# Patient Record
Sex: MALE | Race: WHITE
[De-identification: names, ages, dates, MRNs, and addresses within clinical notes are randomized per-mention and may not be internally consistent; named-entity substitution may affect disease eponyms.]

---

## 2018-01-09 ENCOUNTER — HOSPITAL ENCOUNTER (EMERGENCY)
Dept: HOSPITAL 17 - NED | Age: 68
Discharge: LEFT BEFORE DISCHARGE | End: 2018-01-09
Payer: OTHER GOVERNMENT

## 2018-01-09 VITALS
SYSTOLIC BLOOD PRESSURE: 150 MMHG | RESPIRATION RATE: 16 BRPM | DIASTOLIC BLOOD PRESSURE: 100 MMHG | TEMPERATURE: 97.6 F | HEART RATE: 86 BPM | OXYGEN SATURATION: 96 %

## 2018-01-09 DIAGNOSIS — Z53.21: Primary | ICD-10-CM

## 2018-01-09 PROCEDURE — 99281 EMR DPT VST MAYX REQ PHY/QHP: CPT

## 2018-02-27 ENCOUNTER — HOSPITAL ENCOUNTER (EMERGENCY)
Dept: HOSPITAL 17 - PHED | Age: 68
Discharge: HOME | End: 2018-02-27
Payer: OTHER GOVERNMENT

## 2018-02-27 VITALS — HEIGHT: 71 IN | WEIGHT: 232.81 LBS | BODY MASS INDEX: 32.59 KG/M2

## 2018-02-27 VITALS
RESPIRATION RATE: 18 BRPM | DIASTOLIC BLOOD PRESSURE: 86 MMHG | SYSTOLIC BLOOD PRESSURE: 155 MMHG | HEART RATE: 66 BPM | TEMPERATURE: 97.6 F

## 2018-02-27 VITALS
SYSTOLIC BLOOD PRESSURE: 133 MMHG | RESPIRATION RATE: 18 BRPM | DIASTOLIC BLOOD PRESSURE: 90 MMHG | OXYGEN SATURATION: 98 % | HEART RATE: 68 BPM

## 2018-02-27 DIAGNOSIS — F41.9: ICD-10-CM

## 2018-02-27 DIAGNOSIS — E78.00: ICD-10-CM

## 2018-02-27 DIAGNOSIS — R22.0: ICD-10-CM

## 2018-02-27 DIAGNOSIS — Z85.819: ICD-10-CM

## 2018-02-27 DIAGNOSIS — Z88.0: ICD-10-CM

## 2018-02-27 DIAGNOSIS — Z79.82: ICD-10-CM

## 2018-02-27 DIAGNOSIS — Z86.73: ICD-10-CM

## 2018-02-27 DIAGNOSIS — I10: ICD-10-CM

## 2018-02-27 DIAGNOSIS — Z79.899: ICD-10-CM

## 2018-02-27 DIAGNOSIS — T78.40XA: Primary | ICD-10-CM

## 2018-02-27 DIAGNOSIS — J44.9: ICD-10-CM

## 2018-02-27 PROCEDURE — 99283 EMERGENCY DEPT VISIT LOW MDM: CPT

## 2018-02-27 NOTE — PD
HPI


Chief Complaint:  Facial Pain or Swelling


Time Seen by Provider:  03:48


Travel History


International Travel<30 days:  No


Contact w/Intl Traveler<30days:  No


Traveled to known affect area:  No





History of Present Illness


HPI


The patient is a 67-year-old male that complains of facial swelling since 1:30 

morning yesterday.  Yesterday at 3:00 he took 25 Benadryl and then again at 11:

00 yesterday and finally at 7:30 PM yesterday.  This is approximately his third 

episode of facial swelling since Christmas.  He is being worked up by the 

[facility] for the cause of his allergy, looking for allergens.  He has 

not heard back from them yet.  He denies any wheezing or shortness of breath.  

He denies any syncopal or near syncopal spells.  He denies any chest pain or 

fever.  He has not had a cough.





PFSH


Past Medical History


Arthritis:  Yes


Anxiety:  Yes


Heart Rhythm Problems:  No


Cancer:  Yes (ORAL)


Cardiac Catheterization:  Yes (in 2000)


Cardiovascular Problems:  Yes


High Cholesterol:  Yes


Congestive Heart Failure:  No


COPD:  Yes


Cerebrovascular Accident:  Yes ( told him possible TIAs)


Diabetes:  No


Diminished Hearing:  No


Diverticulitis:  Yes


Hepatitis:  Yes (Hep A)


Hypertension:  Yes


Respiratory:  Yes (SLEEP APNEA, Emphysema, Black lung)


Immunizations Current:  No


Sleep Apnea:  Yes


Influenza Vaccination:  Yes


Pregnant?:  Not Pregnant





Past Surgical History


Abdominal Surgery:  Yes (BILAT HERNIA REPAIR)


Appendectomy:  Yes


Coronary Artery Bypass Graft:  No


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





Social History


Alcohol Use:  No


Tobacco Use:  No


Substance Use:  No





Allergies-Medications


(Allergen,Severity, Reaction):  


Coded Allergies:  


     penicillin G (Unverified  Allergy, Mild, 8/15/17)


Reported Meds & Prescriptions





Reported Meds & Active Scripts


Active


Prednisone 20 Mg Tab 20 Mg PO BID


Reported


Fluticasone Nasal Spray 50 Mcg/Act Naspr 50 Mcg EACH NARE BID


     50 mcg/spray


Symbicort Inh (Budesonide/Formoterol Fumarate) 80-4.5 Mcg/Act Aero 2 Puff INH 

Q12HR


Proventil Hfa 6.7 GM Inh (Albuterol Sulfate) 90 Mcg/Act Aer 2 Puff INH Q4-6H PRN


Buspirone (Buspirone HCl) 10 Mg Tab 10 Mg PO BID


Aspirin Low Dose (Aspirin) 81 Mg Chew 81 Mg CHEW DAILY


Pravastatin 40 Mg Tab 40 Mg PO DAILY


Omeprazole 40 Mg Cap 40 Mg PO DAILY


Non-Aspirin (Acetaminophen) 325 Mg Tab 325 Mg PO BID PRN


Docusate Sodium 100 Mg Cap 100 Mg PO BID


Metoprolol Succinate ER 24 HR (Metoprolol Succinate) 50 Mg Tab 50 Mg PO DAILY


Triamterene-Hydrochlorothiazide 37.5-25 Mg Cap 1 Cap PO DAILY


Diphenhydramine (Diphenhydramine HCl) 25 Mg Cap 25 Mg PO TID PRN


Famotidine 20 Mg Tab 20 Mg PO BID


Dicyclomine (Dicyclomine HCl) 10 Mg Cap 10 Mg PO TID


Antivert (Meclizine HCl) 25 Mg Tab 25 Mg PO DAILY


Diltiazem Hcl Er (Diltiazem HCl) 120 Mg Tab 120 Mg PO DAILY


Mobic (Meloxicam) 15 Mg Tab 15 Mg PO DAILY


Tramadol HCl ER (Tramadol HCl) 100 Mg Tab 100 Mg PO Q4-6HPRN


Lioresal  10 Mg Tab (Baclofen) 10 Mg Tab 10 Mg PO BID


Pravastatin Sodium (Pravastatin Sod) 10 Mg Tab 40 Mg PO HS


Darvocet-N 100 (Propoxyphene Napsylate/Acetam)  Tab 1 Tab PO BID


     FOR PAIN


Nitrostat (Nitroglycerin) 0.4 Mg Subl 0.4 Mg SL PRN


     1 TAB SL EVERY 5 MINS X 3 PRN CHEST PAIN


Colace (Docusate Sodium) 100 Mg Cap 100 Mg PO BID


Prilosec 20 mg (Omeprazole) 20 Mg Capcr 20 Mg PO TID


Klonopin (Clonazepam) 0.5 Mg Tab 1.5 Mg PO HSPRN


Zocor 40 mg (Simvastatin) 40 Mg Tab 20 Mg PO HS


Toprol Xl (Metoprolol Succinate) 25 Mg Tabcr 37.5 Mg PO DAILY


Fioricet (Acetaminophen/Butalbital/Caffeine)  Tab 1 Tab PO Q4HPRN


     FOR HEADACHE


Aspirin 81 Mg Tab 81 Mg PO DAILY








Review of Systems


Except as stated in HPI:  all other systems reviewed are Neg





Physical Exam


Narrative


GENERAL: Well-nourished, well-developed patient in no apparent distress other 

than the facial swelling.  His vital signs show temperature 97.6 and blood 

pressure 155/86 but otherwise normal.


SKIN: Focused skin assessment warm/dry.


HEAD: Normocephalic.


EYES: No scleral icterus. No injection or drainage. 


NECK: Supple, trachea midline. No JVD or lymphadenopathy.


CARDIOVASCULAR: Regular rate and rhythm without murmurs, gallops, or rubs. 


RESPIRATORY: Breath sounds equal bilaterally. No accessory muscle use.  Lungs 

clear to auscultation bilaterally, specifically there is no wheezing heard.


GASTROINTESTINAL: Abdomen soft, non-tender, nondistended. 


MUSCULOSKELETAL: No cyanosis, or edema. 


BACK: Nontender without obvious deformity. No CVA tenderness. 


ENT: The patient was generalized swelling which is mild.  There is no areas of 

heavy swelling such as one would expect with angioedema.  There is minimal lip 

swelling and slight swelling inside the mouth.





Data


Data


Last Documented VS





Vital Signs








  Date Time  Temp Pulse Resp B/P (MAP) Pulse Ox O2 Delivery O2 Flow Rate FiO2


 


2/27/18 03:36   18     


 


2/27/18 03:20 97.6 66  155/86 (109)    








Orders





 Orders


Prednisone (Deltasone) (2/27/18 04:00)








MDM


Medical Decision Making


Medical Screen Exam Complete:  Yes


Emergency Medical Condition:  Yes


Medical Record Reviewed:  Yes


Differential Diagnosis


Allergic reaction, angioedema, cellulitis-unlikely


Narrative Course


The patient likely has allergic reaction.  This does not appear as angioedema.





Plan: The patient will be given a tapered course of prednisone, 20 mg twice 

daily for 3 days followed by 20 mg once daily for 3 days.  [facility].  He may actually need to see an 

allergist through the [facility].





Diagnosis





 Primary Impression:  


 Allergic reaction





***Additional Instructions:  


The prednisone is taken one tablet twice a day for 3 days followed by one 

tablet once daily for 3 days.  Follow-up with your allergist when you get back 

to [place].


***Med/Other Pt SpecificInfo:  Prescription(s) given


Scripts


Prednisone (Prednisone) 20 Mg Tab


20 MG PO BID for X 5 days than daily X 5 days, #15 TAB 0 Refills


   Prov: Henry Montenegro MD         2/27/18


Disposition:  01 DISCHARGE HOME


Condition:  Stable











Henry Montenegro MD Feb 27, 2018 04:09